# Patient Record
Sex: FEMALE | Race: OTHER | HISPANIC OR LATINO | Employment: PART TIME | URBAN - METROPOLITAN AREA
[De-identification: names, ages, dates, MRNs, and addresses within clinical notes are randomized per-mention and may not be internally consistent; named-entity substitution may affect disease eponyms.]

---

## 2023-01-30 ENCOUNTER — DOCUMENTATION ONLY (OUTPATIENT)
Dept: REHABILITATION | Facility: HOSPITAL | Age: 53
End: 2023-01-30

## 2023-01-30 NOTE — PROGRESS NOTES
Patient was called pertaining to missed evaluation today. Patient  did not answer but detailed message was left with information to call back.     Jacquelyn Smith, PT, DPT, PPCES

## 2023-02-07 ENCOUNTER — CLINICAL SUPPORT (OUTPATIENT)
Dept: REHABILITATION | Facility: HOSPITAL | Age: 53
End: 2023-02-07
Attending: STUDENT IN AN ORGANIZED HEALTH CARE EDUCATION/TRAINING PROGRAM
Payer: MEDICAID

## 2023-02-07 DIAGNOSIS — N39.3 PRIMARY STRESS URINARY INCONTINENCE: ICD-10-CM

## 2023-02-07 DIAGNOSIS — M62.89 PELVIC FLOOR DYSFUNCTION IN FEMALE: ICD-10-CM

## 2023-02-07 PROCEDURE — 97161 PT EVAL LOW COMPLEX 20 MIN: CPT | Mod: PN

## 2023-02-07 PROCEDURE — 97530 THERAPEUTIC ACTIVITIES: CPT | Mod: PN

## 2023-02-07 NOTE — PLAN OF CARE
[facility]   Physical Therapy Initial Evaluation     Date: 2023   Name: Marcella Castellanos  [facility] Number: [id number]    Therapy Diagnosis:   Encounter Diagnoses   Name Primary?    Primary stress urinary incontinence     Pelvic floor dysfunction in female      Physician: Vikas Noe MD    Physician Orders: PT Eval and Treat   Medical Diagnosis from Referral: N39.3 (ICD-10-CM) - Primary stress urinary incontinence  Evaluation Date: 2023  Authorization Period Expiration: 2023  Plan of Care Expiration: 2023  Progress Note Due: 2023  Visit # / Visits authorized:    FOTO: 1/3    Precautions: Standard     Time In: 10:35 AM  Time Out: 11:35 AM  Total Appointment Time (timed & untimed codes): 60 minutes    SUBJECTIVE     Date of onset: Patient reports increased pelvic pressure and discomfort with intercourse since her hysterectomy.     History of current condition - Marcella reports: Her hysterectomy was in . Symptoms started 6 years ago. Patient describes discomfort as she feels her vaginal canal isn't as tight as before. Patient reports occasional stress incontinence and denies urge incontinence. Patient reports decreased sensation and pain with intercourse. Patient reports a history of cleaning and heavy lifting which she concludes may have contributed to her symptoms. 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    OB/GYN History: , vaginal delivery, episiotomy, pushing phase over 2 hours, painful periods, painful vaginal penetration, pelvic pain, and hysterectomy and bladder lift   Using vaginal estrogen cream: Yes  Sexually active? Yes  Pain with vaginal exams, intercourse or tampon use? with intercourse (more discomfort) and vaginal exams     Bladder/Bowel History: urinary incontinence, constipation, straining to have bowel movements   Frequency of urination:   Daytime: with in normal  limits             Nighttime: 0  Difficulty initiating urine stream: No  Urine stream: strong and interrupted  Complete emptying: No  Bladder leakage: Yes  Activities that cause leakage: heavy coughing   Frequency of incidents: once a week   Amount leaked (urine): few drops  Urinary Urgency: Yes.  Able to delay the urge for at least 0-30 minute(s).  Pain with delaying the urge to urinate: No     Frequency of bowel movements: 2 times a day  Difficulty initiating BM: Yes  Quality/Shape of BM: Mobile Stool Chart type 3   Does Patient Feel Empty after BM? Yes  Bowel Urgency: No.  Able to delay the urge for at least 0-60 minute(s).  Fiber Supplements or Laxative Use? No   Pain with BM: No   Bleeding with BM: No   Colon leakage: No    Form of protection: none reported  Number of pads required in 24 hours: none     Pain:  Location: pelvic pain and low back   Current 0/10, worst 6/10, best 0/10   Pelvic Pain Duration Occurs during and after provocation   Location of Pelvic Pain: Deep pelvic floor muscles   Description: trapping   Aggravating Factors/Activities that cause symptoms: Vaginal exam/provocation and Prolonged sitting   Easing Factors: rest      Medical History: Marcella  has a past medical history of Abnormal Pap smear of cervix, Anemia, Asthma, Atypical hyperplasia of breast, Fibroids, Hydrosalpinx, Hypercholesterolemia, and Ovarian cyst.     Surgical History: Marcella Castellanos  has a past surgical history that includes Anterior vaginal repair; Augmentation of breast (Bilateral); Surgical removal of endometrioma; Laparoscopy-assisted vaginal hysterectomy; obtryx; Ovarian cyst removal; Tubal ligation; Breast biopsy (Left); and Liposuction of thigh.    Medications: Marcella has a current medication list which includes the following prescription(s): albuterol, estradiol, potassium chloride, and pravastatin.    Allergies: Review of patient's allergies indicates:  No Known Allergies     Prior Therapy/Previous treatment included:  no  Social History: live alone   Occupation: clean houses   Prior Level of Function: Pt was independent with all ADLs and iADLs without pain, no reports of incontinence of bowel or bladder.  Current Level of Function: Patient is modified independent with ADLs and functional mobility secondary to pelvic pressure and occasional incontinence.     Types of fluid intake: Water: bottles/day and Soda  Diet: Standard  Habitus: thin  Abuse/Neglect: Pt denies a history of physical or emotional abuse at this visit.       Pts goals: strengthen pelvis     OBJECTIVE     See EMR under MEDIA for written consent provided 2/7/2023  Chaperone: declined    ORTHO SCREEN  Posture in sitting: slouched   Posture in standing: forward and rounded shoulders   Pelvic alignment: Not assessed today      BREATHING MECHANICS ASSESSMENT   Thorax Assessment During Quiet Respiration: Decreased excursion of abdominal wall  and Decreased excursion bilaterally of lateral ribs     VAGINAL PELVIC FLOOR EXAM    EXTERNAL ASSESSMENT  Introitus: WNL  Skin condition: redness and atrophy   Scarring: did not formally assess today    Sensation: WNL   Pain: none  Voluntary contraction: nil  Voluntary relaxation: nil  Involuntary contraction: nil  Bearing down: visible lift  Perineal descent: present      INTERNAL ASSESSMENT  Pain: tender areas noted as follows: bilaterally through levator ani (layer 3)   Sensation: able to sense generalized pressure, unable to identify location   Vaginal vault: with in normal limits     Muscle Bulk: atrophy   Muscle Power: 3/5 with verbal cue to tighten around finger   Muscle Endurance: 10 sec  # Reps To Fatigue: 1    Fast Contractions in 10 seconds: 3     Quality of contraction: WNL   Specificity: WNL   Coordination: tends to hold breath during PFM contration   Prolapse check: Grade 2 cystocele; maximal verbal cues to get patient to accurately bulge   Does Pelvic Floor drop and relax with a diaphragmatic breath?  no      TREATMENT     Treatment Time In: 11:25 AM  Treatment Time Out: 11:35 AM  Total Treatment time (time-based codes) separate from Evaluation: 10 minutes    Therapeutic Activity Patient participated in dynamic functional therapeutic activities to improve functional performance for 10 minutes. Including: Education as described below.     Patient Education provided:   general anatomy/physiology of urinary/ bowel  system, benefits of treatment, risks of treatment, and alternative methods of treatment was discussed with the pt. Additionally, bladder irritants, anatomy/physiology of pelvic floor, posture/body mechanices, diaphragmatic breathing, kegels, behavior modifications, and Coordination of kegels with functional activities such as cough, laugh, sneeze, lift, etc.  was reviewed.     Home Exercises provided:  Written Home Exercises provided: yes.  Exercises were reviewed and Marcella was able to demonstrate them prior to the end of the session.    Marcella demonstrated good  understanding of the education provided.     See EMR under Patient Instructions for exercises provided 2/7/2023.    ASSESSMENT     Marcella is a 52 y.o. female referred to outpatient Physical Therapy with a medical diagnosis of N39.3 (ICD-10-CM) - Primary stress urinary incontinence. Pt presents with altered posture, poor knowledge of body mechanics and posture, pelvic floor tenderness, perineal descent, decreased pelvic muscle strength, decreased phasic ability of the pelvic muscles, poor coordination of pelvic floor muscles during ADL's leading to urinary or fecal leakage, poor fluid intake, incomplete urination, and dysfunctional defecation. Patient presents with signs and symptoms consistent with pelvic organ prolapse and pelvic floor dysfunction.       Pt prognosis is Good.   Pt will benefit from skilled outpatient Physical Therapy to address the deficits stated above and in the chart below, provide pt/family education, and to maximize pt's level of  independence.     Plan of care discussed with patient: Yes  Pt's spiritual, cultural and educational needs considered and patient is agreeable to the plan of care and goals as stated below:     Anticipated Barriers for therapy: none    Medical Necessity is demonstrated by the following:    History  Co-morbidities and personal factors that may impact the plan of care Co-morbidities   prior pelvic surgery    Personal Factors  age     low   Examination  Body structures and functions, activity limitations and participation restrictions that may impact the plan of care Body Regions/Systems/Functions:  altered posture, poor knowledge of body mechanics and posture, pelvic floor tenderness, perineal descent, decreased pelvic muscle strength, decreased phasic ability of the pelvic muscles, poor coordination of pelvic floor muscles during ADL's leading to urinary or fecal leakage, poor fluid intake, incomplete urination, and dysfunctional defecation     Activity Limitations:  intercourse/vaginal exam/tampon use without pain, incontinence with ADLs, and Participating in social activities and ADLs due to pelvic pressure    Participation Restrictions:  all ADLs/iADLs uninterrupted by urinary incontinence/urgency/frequency, Pelvic pressure with ADLs. , and exercise restrictions due to incontinence     Activity limitations:   Learning and applying knowledge  no deficits    General Tasks and Commands  no deficits    Communication  no deficits    Mobility  no deficits    Self care  no deficits    Domestic Life  shopping  cooking  doing house work (cleaning house, washing dishes, laundry)    Interactions/Relationships  intimate relationships    Life Areas  employment    Community and Social Life  community life  recreation and leisure       moderate   Clinical Presentation stable and uncomplicated low   Decision Making/ Complexity Score: low       Goals:  Short Term Goals: 4 weeks   Pt to be edu pelvic muscle bracing and be able to  consistently perform correctly and quickly to help decrease incontinence with cough/laugh/sneeze.  Pt to demonstrate proper diaphragmatic breathing to help with calming the nervous system in order to decrease pain and to improve abdominal wall musculature extensibility.   Pt to report being able to complete a half day at work without significant increase in pain to demonstrate a return towards prior level of function.  Pt to demonstrate good body mechanics when performing ADLs such as lifting and bending to decrease strain to lumbopelvic structures and reduce risk of further injury.  Pt to demonstrate proper positioning on commode with breathing techniques to decrease strain with BM to enable pt to feel empty after BM.       Long Term Goals: 12 weeks   Pt to be discharged with home plan for carry over after discharge.    Pt to report elimination of incontinence with ADLs to demonstrate improved pelvic floor muscle strength and coordination.  Pt will be trained and compliant with postural strategies in sitting and standing to improve alignment and decrease pain and muscle fatigue  Pt to report being able to complete a full day at work without significant increase in pain to demonstrate a return towards prior level of function.      PLAN   Plan of care Certification: 2/7/2023 to 4/30/2023.    Outpatient Physical Therapy 1 times weekly for 12 weeks to include the following interventions: therapeutic exercises, therapeutic activity, neuromuscular re-education, manual therapy, modalities PRN, patient/family education, and self care/home management    Jacquelyn Smith, PT, DPT, PPCES      I CERTIFY THE NEED FOR THESE SERVICES FURNISHED UNDER THIS PLAN OF TREATMENT AND WHILE UNDER MY CARE   Physician's comments:     Physician's Signature: ___________________________________________________

## 2023-02-13 ENCOUNTER — CLINICAL SUPPORT (OUTPATIENT)
Dept: REHABILITATION | Facility: HOSPITAL | Age: 53
End: 2023-02-13
Payer: MEDICAID

## 2023-02-13 DIAGNOSIS — M62.89 PELVIC FLOOR DYSFUNCTION IN FEMALE: Primary | ICD-10-CM

## 2023-02-13 PROCEDURE — 97112 NEUROMUSCULAR REEDUCATION: CPT | Mod: PN

## 2023-02-13 PROCEDURE — 97530 THERAPEUTIC ACTIVITIES: CPT | Mod: PN

## 2023-02-13 PROCEDURE — 97110 THERAPEUTIC EXERCISES: CPT | Mod: PN

## 2023-02-13 NOTE — PROGRESS NOTES
"  Physical Therapy Daily Treatment Note     Name: Marcella Castellanos  Clinic Number: [id number]    Therapy Diagnosis:   Encounter Diagnosis   Name Primary?    Pelvic floor dysfunction in female Yes     Physician: Vikas Noe MD    Visit Date: 2/13/2023    Physician Orders: PT Eval and Treat   Medical Diagnosis from Referral: N39.3 (ICD-10-CM) - Primary stress urinary incontinence  Evaluation Date: 2/7/2023  Authorization Period Expiration: 2/20/2023  Plan of Care Expiration: 4/30/2023  Progress Note Due: 2/28/2023  Visit # / Visits authorized: 1/ 1   FOTO: 1/3     Precautions: Standard     Time In: 2:02 PM   Time Out: 2:55 PM  Total Billable Time: 53 minutes    Precautions: Standard    Subjective     Pt reports: too soon to see any progress yet.   She was compliant with home exercise program.  Response to previous treatment: no change reported   Functional change: no change reported     Pain: 0/10  Location: none     Constitutional Symptoms Review: The patient denies having any constitutional symptoms.     Objective   No intravaginal treatment today.  Signed consent form already on file.     Therapeutic Exercise to develop  strength, endurance, ROM, posture, and core stabilization for 30 minutes including:   Bridges 2 x 10   Steam boats x 10 2  Sit to stands with add ball 2 x 10   HL hip add with ball 10 x 10 seconds   Anti rotations 2 x 10 7#   SL reverse clams 2 x 10   posterior pelvic tilts 10 x 10 seconds   side stepping x 10    Pelvic circles on physio ball x 20 cw/ccw    Neuromuscular Re-education to develop Coordination, Control, and Posture for 13 minutes including:   TA contraction 5" x 10   diaphragmatic breathing x 3 minutes   kegel Quick flicks 2 x 10    Endurance holds 5" x 10     Manual Therapy to develop flexibility, extensibility, and desensitization for 0 minutes including:  none     Therapeutic Activity Patient participated in dynamic functional therapeutic activities to improve functional " performance for 10 minutes. Including: Education as described below.  -kegel with stress     Home Exercises Provided and Patient Education Provided     Education provided:   bladder irritants, anatomy/physiology of pelvic floor, posture/body mechanices, bladder retraining, diaphragmatic breathing, isometric abdominal exercises, kegels, behavior modifications, and Coordination of kegels with functional activities such as cough, laugh, sneeze, lift, etc.   Discussed progression of plan of care with patient; educated pt in activity modification; reviewed HEP with pt. Pt demonstrated and verbalized understanding of all instruction and was provided with a handout of HEP (see Patient Instructions).    Written Home Exercises Provided: yes.  Exercises were reviewed and Marcella was able to demonstrate them prior to the end of the session.  Marcella demonstrated good  understanding of the education provided.     See EMR under Patient Instructions for exercises provided 2/13/2023.    Assessment     Patient presents with continued pelvic pressure. Patient was started on pelvic floor muscle strengthening and stretching for optional length tension relationship of muscles. Core and hip strengthening exercises were performed for increased postural stability to better support pelvic floor musculature. Patient tolerated all exercises well with no complaints. Patient was taught kegel with activities which make her leak/stress to decrease stress incontinence. Pt will continue to benefit from skilled outpatient physical therapy to address the deficits listed in the problem list box on initial evaluation, provide pt/family education and to maximize pt's level of independence in the home and community environment.       Marcella Is progressing well towards her goals.   Pt prognosis is Good.     Anticipated barriers to physical therapy: language barrier     Progress towards goals:  good    Goals:   Short Term Goals: 4 weeks   Pt to be edu pelvic muscle  bracing and be able to consistently perform correctly and quickly to help decrease incontinence with cough/laugh/sneeze.  Pt to demonstrate proper diaphragmatic breathing to help with calming the nervous system in order to decrease pain and to improve abdominal wall musculature extensibility.   Pt to report being able to complete a half day at work without significant increase in pain to demonstrate a return towards prior level of function.  Pt to demonstrate good body mechanics when performing ADLs such as lifting and bending to decrease strain to lumbopelvic structures and reduce risk of further injury.  Pt to demonstrate proper positioning on commode with breathing techniques to decrease strain with BM to enable pt to feel empty after BM.         Long Term Goals: 12 weeks   Pt to be discharged with home plan for carry over after discharge.    Pt to report elimination of incontinence with ADLs to demonstrate improved pelvic floor muscle strength and coordination.  Pt will be trained and compliant with postural strategies in sitting and standing to improve alignment and decrease pain and muscle fatigue  Pt to report being able to complete a full day at work without significant increase in pain to demonstrate a return towards prior level of function.    New/Revised goals: Continue with current established goals at this time.      Pt's spiritual, cultural and educational needs considered and pt agreeable to plan of care and goals.    Plan     Plan of care Certification: 2/7/2023 to 4/30/2023.     Continue with established Plan of Care, working toward established PT goals.      Jacquelyn Smith, PT, DPT, PPCES   2/13/2023

## 2023-02-21 ENCOUNTER — DOCUMENTATION ONLY (OUTPATIENT)
Dept: REHABILITATION | Facility: HOSPITAL | Age: 53
End: 2023-02-21
Payer: MEDICAID

## 2023-02-21 NOTE — PROGRESS NOTES
Patient was called pertaining to missed appointment yesterday. Patient reports she called and was told she didn't have an appointment. Patient was given the proper number to call and was scheduled for next week.    Jacquelyn Smith, PT, DPT, PPCES

## 2023-02-28 ENCOUNTER — CLINICAL SUPPORT (OUTPATIENT)
Dept: REHABILITATION | Facility: HOSPITAL | Age: 53
End: 2023-02-28
Payer: MEDICAID

## 2023-02-28 DIAGNOSIS — M62.89 PELVIC FLOOR DYSFUNCTION IN FEMALE: Primary | ICD-10-CM

## 2023-02-28 PROCEDURE — 97110 THERAPEUTIC EXERCISES: CPT | Mod: PN

## 2023-02-28 NOTE — PATIENT INSTRUCTIONS
Proper Pressure Management  Avoid Constipation - make high-fiber foods part of your regular diet (fruits, vegetables, bran, and beans), reduce the amount of processed foods in your diet, drink plenty of water and fluids every day, exercise daily, and manage your stress with meditation or other relaxation techniques  No Straining! Straining (bearing down and holding your breath) puts additional downward pressure on our organs, causing increased prolapse and pelvic pressure. Instead, blow out the candles as you gently push down. Do NOT hold your breath!  Use a Stool - Promote a squat position when voiding. Get your knees up higher than your hips. Squatting helps relax the pelvic floor muscles and reduces straining.  Avoid heavy lifting and high-impact activities until you can strengthen your core and pelvic floor muscles appropriately. When you do have to lift hold the load close to your body to reduce strain and do not hold your breath when lifting.  Do not Valsalva (hold your breath and push) at any time  Use diaphragmatic breathing (belly breathing) to manage stress, help empty bladder, help empty bowels, and help lightly stretch pelvic floor muscles     Bowel Program  Adult bowel function is considered normal when:   You have a bowel movement every day or every other day  Your bowel movement is soft in consistency - like a banana  Toileting is completed without straining  There is a presence of controllable urge.  Tips for efficient bowel training:  Drink a glass of warm water upon waking in the morning to stimulate the GI tract  Do NOT skip breakfast  Best time for a bowel movement is 30 min-1 hour after waking or after breakfast due to the gastro-colic reflex. There is a 2-3-fold increase in colonic motility after waking!  Be sure to devote enough time toward bowel function in the morning (at least 5-10 minutes), but you shouldn't need to sit longer than that.  Eat regular meals at similar times every day  Aim  for 25-30 grams of fiber for women or 35-40 grams of fiber for men each day. Start adding fiber slowly to diet and try to spread fiber out throughout the day as opposed to at one meal to avoid gas and abdominal pain. Fiber increases stool weight and accelerates colonic transit time.  Drink 6-8 (8oz) glasses of water daily and avoid caffeine drinks   Do NOT delay an urge to defecate, be prepared for need in public  Ritualize bowel habits to establish a routine pattern of bowel movements  Most people who have normal bowel patterns usually empty their bowels at the same time each day- conditioned reflex  Proper Toileting Mechanics:  Sit on toilet and lean forward with feet positioned on a step or stool so knees are higher than hips. Use a Squatty Potty!  Do NOT close glottis - keep mouth and throat open. This facilitates PF muscle relaxation! (ex: blow up a balloon, gently blow air out, blow bubbles)  Do NOT hold breath or strain!

## 2023-02-28 NOTE — PROGRESS NOTES
"  Physical Therapy Daily Treatment Note     Name: Marcella Castellanos  Clinic Number: [id number]    Therapy Diagnosis:   Encounter Diagnosis   Name Primary?    Pelvic floor dysfunction in female Yes       Physician: Vikas Noe MD    Visit Date: 2/28/2023    Physician Orders: PT Eval and Treat   Medical Diagnosis from Referral: N39.3 (ICD-10-CM) - Primary stress urinary incontinence  Evaluation Date: 2/7/2023  Authorization Period Expiration: 2/20/2023  Plan of Care Expiration: 4/30/2023  Progress Note Due: 2/28/2023  Visit # / Visits authorized: 2/ 4  FOTO: 1/3     Precautions: Standard     Time In: 4:00 PM   Time Out: 4:58 PM  Total Billable Time: 58 minutes    Precautions: Standard    Subjective     Pt reports: feeling better, cant feel the pelvic pressure as much any more.   She was compliant with home exercise program.  Response to previous treatment: no change reported   Functional change: no change reported     Pain: 0/10  Location: none     Constitutional Symptoms Review: The patient denies having any constitutional symptoms.     Objective   No intravaginal treatment today.  Signed consent form already on file.     Therapeutic Exercise to develop  strength, endurance, ROM, posture, and core stabilization for 28 minutes including:   Bridges 2 x 10   Steam boats x 10   Sit to stands with add ball 2 x 10   HL hip add with ball 10 x 10 seconds   Anti rotations 2 x 10 7#  SL reverse clams 2 x 10   posterior pelvic tilts 10 x 10 seconds   Pelvic circles on physio ball x 20 cw/ccw     Deferred: side stepping x 10      Neuromuscular Re-education to develop Coordination, Control, and Posture for 15 minutes including:   TA contraction 5" x 10   diaphragmatic breathing x 3 minutes   kegel Quick flicks 2 x 10    With march 2 x 10    Endurance holds 5" x 10     Manual Therapy to develop flexibility, extensibility, and desensitization for 0 minutes including:  none     Therapeutic Activity Patient participated in " dynamic functional therapeutic activities to improve functional performance for 15 minutes. Including: Education as described below.  - pressure management   - constipation and bowel health   - reinforced kegel with stress  - foods high in fiber     Home Exercises Provided and Patient Education Provided     Education provided:   bladder irritants, anatomy/physiology of pelvic floor, posture/body mechanices, bladder retraining, diaphragmatic breathing, isometric abdominal exercises, kegels, behavior modifications, and Coordination of kegels with functional activities such as cough, laugh, sneeze, lift, etc.   Discussed progression of plan of care with patient; educated pt in activity modification; reviewed HEP with pt. Pt demonstrated and verbalized understanding of all instruction and was provided with a handout of HEP (see Patient Instructions).    Written Home Exercises Provided: yes.  Exercises were reviewed and Marcella was able to demonstrate them prior to the end of the session.  Marcella demonstrated good  understanding of the education provided.     See EMR under Patient Instructions for exercises provided 2/13/2023.    Assessment     Patient presents with decreased pelvic pressure. Patient was started on pelvic floor muscle strengthening was continued for maximal pelvic floor muscular function. Core and hip strengthening exercises were continued for increased postural stability to better support pelvic floor musculature. Patient tolerated all exercises well with no complaints. Patient was taught bowel health for decreased constipation and pressure management for decreased risk of prolapse. Pt will continue to benefit from skilled outpatient physical therapy to address the deficits listed in the problem list box on initial evaluation, provide pt/family education and to maximize pt's level of independence in the home and community environment.       Marcella Is progressing well towards her goals.   Pt prognosis is Good.      Anticipated barriers to physical therapy: language barrier     Progress towards goals:  good    Goals:   Short Term Goals: 4 weeks   Pt to be edu pelvic muscle bracing and be able to consistently perform correctly and quickly to help decrease incontinence with cough/laugh/sneeze.   Pt to demonstrate proper diaphragmatic breathing to help with calming the nervous system in order to decrease pain and to improve abdominal wall musculature extensibility.   Pt to report being able to complete a half day at work without significant increase in pain to demonstrate a return towards prior level of function.  Pt to demonstrate good body mechanics when performing ADLs such as lifting and bending to decrease strain to lumbopelvic structures and reduce risk of further injury.  Pt to demonstrate proper positioning on commode with breathing techniques to decrease strain with BM to enable pt to feel empty after BM.         Long Term Goals: 12 weeks   Pt to be discharged with home plan for carry over after discharge.    Pt to report elimination of incontinence with ADLs to demonstrate improved pelvic floor muscle strength and coordination.  Pt will be trained and compliant with postural strategies in sitting and standing to improve alignment and decrease pain and muscle fatigue  Pt to report being able to complete a full day at work without significant increase in pain to demonstrate a return towards prior level of function.    New/Revised goals: Continue with current established goals at this time.      Pt's spiritual, cultural and educational needs considered and pt agreeable to plan of care and goals.    Plan     Plan of care Certification: 2/7/2023 to 4/30/2023.     Continue with established Plan of Care, working toward established PT goals.      Jacquelyn Smith, PT, DPT, PPCES   2/28/2023

## 2023-03-06 ENCOUNTER — CLINICAL SUPPORT (OUTPATIENT)
Dept: REHABILITATION | Facility: HOSPITAL | Age: 53
End: 2023-03-06
Payer: MEDICAID

## 2023-03-06 DIAGNOSIS — M62.89 PELVIC FLOOR DYSFUNCTION IN FEMALE: Primary | ICD-10-CM

## 2023-03-06 PROCEDURE — 97110 THERAPEUTIC EXERCISES: CPT | Mod: PN

## 2023-03-06 NOTE — PROGRESS NOTES
"  Physical Therapy Daily Treatment Note     Name: Marcella Castellanos  Clinic Number: [id number]    Therapy Diagnosis:   Encounter Diagnosis   Name Primary?    Pelvic floor dysfunction in female Yes     Physician: Vikas Noe MD    Visit Date: 3/6/2023    Physician Orders: PT Eval and Treat   Medical Diagnosis from Referral: N39.3 (ICD-10-CM) - Primary stress urinary incontinence  Evaluation Date: 2/7/2023  Authorization Period Expiration: 2/20/2023  Plan of Care Expiration: 4/30/2023  Progress Note Due: 4/6/2023  Visit # / Visits authorized: 3/ 4  FOTO: 1/3     Precautions: Standard     Time In: 2:00 PM   Time Out: 2:55 PM  Total Billable Time: 55 minutes    Precautions: Standard    Subjective     Pt reports: she doesn't feel the pelvic pressure anymore and had no leaking this week.  She was compliant with home exercise program.  Response to previous treatment: no change reported   Functional change: no change reported     Pain: 0/10  Location: none     Constitutional Symptoms Review: The patient denies having any constitutional symptoms.     Objective   No intravaginal treatment today.  Signed consent form already on file.     Therapeutic Exercise to develop  strength, endurance, ROM, posture, and core stabilization for 30 minutes including:   Bridges 2 x 10   Steam boats x 10   Sit to stands with add ball 2 x 10   HL hip add with ball 10 x 10 seconds   Anti rotations 2 x 10 7#   SL reverse clams 2 x 10   Pelvic circles on physio ball x 20 cw/ccw   side stepping x 5 laps in // bars   Tandem walking for hip stability x 5 laps in // bars     Deferred:   posterior pelvic tilts 10 x 10 seconds     Neuromuscular Re-education to develop Coordination, Control, and Posture for 20 minutes including:   TA contraction 5" x 10    + With march x 20   + With ball toss x 30   diaphragmatic breathing x 1 minutes   kegel Quick flicks 2 x 10    With march 2 x 10    Endurance holds 5" x 10     Manual Therapy to develop " flexibility, extensibility, and desensitization for 0 minutes including:  none     Therapeutic Activity Patient participated in dynamic functional therapeutic activities to improve functional performance for 5 minutes. Including: Education as described below.  - plan of care/ discharge next visit     Home Exercises Provided and Patient Education Provided     Education provided:   bladder irritants, anatomy/physiology of pelvic floor, posture/body mechanices, bladder retraining, diaphragmatic breathing, isometric abdominal exercises, kegels, behavior modifications, and Coordination of kegels with functional activities such as cough, laugh, sneeze, lift, etc.   Discussed progression of plan of care with patient; educated pt in activity modification; reviewed HEP with pt. Pt demonstrated and verbalized understanding of all instruction and was provided with a handout of HEP (see Patient Instructions).    Written Home Exercises Provided: yes.  Exercises were reviewed and Marcella was able to demonstrate them prior to the end of the session.  Marcella demonstrated good  understanding of the education provided.     See EMR under Patient Instructions for exercises provided 2/13/2023.    Assessment     Patient presents with decreased pelvic pressure and no urinary incontinence. Patient is progressing well with therapy and has met 8 out of 9 goals. Exercise program was progressed and patient tolerated well with minimal fatigue. Plan to discharge next visit.     Marcella Is progressing well towards her goals.   Pt prognosis is Good.     Anticipated barriers to physical therapy: language barrier     Progress towards goals:  good    Goals:   Short Term Goals: 4 weeks   Pt to be edu pelvic muscle bracing and be able to consistently perform correctly and quickly to help decrease incontinence with cough/laugh/sneeze. MET 3/6/2023  Pt to demonstrate proper diaphragmatic breathing to help with calming the nervous system in order to decrease pain  and to improve abdominal wall musculature extensibility. MET 3/6/2023  Pt to report being able to complete a half day at work without significant increase in pain to demonstrate a return towards prior level of function. MET 3/6/2023  Pt to demonstrate good body mechanics when performing ADLs such as lifting and bending to decrease strain to lumbopelvic structures and reduce risk of further injury. MET 3/6/2023  Pt to demonstrate proper positioning on commode with breathing techniques to decrease strain with BM to enable pt to feel empty after BM. MET 3/6/2023        Long Term Goals: 12 weeks   Pt to be discharged with home plan for carry over after discharge.    Pt to report elimination of incontinence with ADLs to demonstrate improved pelvic floor muscle strength and coordination. MET 3/6/2023  Pt will be trained and compliant with postural strategies in sitting and standing to improve alignment and decrease pain and muscle fatigue. MET 3/6/2023  Pt to report being able to complete a full day at work without significant increase in pain to demonstrate a return towards prior level of function. MET 3/6/2023    New/Revised goals: Continue with current established goals at this time.      Pt's spiritual, cultural and educational needs considered and pt agreeable to plan of care and goals.    Plan     Plan of care Certification: 2/7/2023 to 4/30/2023.     Continue with established Plan of Care, working toward established PT goals.      Jacquelyn Smith, PT, DPT, PPCES   3/6/2023

## 2023-03-13 ENCOUNTER — CLINICAL SUPPORT (OUTPATIENT)
Dept: REHABILITATION | Facility: HOSPITAL | Age: 53
End: 2023-03-13
Payer: MEDICAID

## 2023-03-13 DIAGNOSIS — M62.89 PELVIC FLOOR DYSFUNCTION IN FEMALE: Primary | ICD-10-CM

## 2023-03-13 PROCEDURE — 97110 THERAPEUTIC EXERCISES: CPT | Mod: PN

## 2023-03-13 NOTE — PLAN OF CARE
[facility]   Discharge Note    Name: Marcella Castellanos  Clinic Number: [id number]    Therapy Diagnosis:   Encounter Diagnosis   Name Primary?    Pelvic floor dysfunction in female Yes     Physician: Vikas Noe MD    Physician Orders: PT Eval and Treat   Medical Diagnosis from Referral: N39.3 (ICD-10-CM) - Primary stress urinary incontinence  Evaluation Date: 2/7/2023    Date of Last visit: 3/13/2023  Total Visits Received: 4    ASSESSMENT        Discharge reason: Patient has met all of his/her goals    Discharge FOTO Score: 0% limitation     Goals: Short Term Goals: 4 weeks   Pt to be edu pelvic muscle bracing and be able to consistently perform correctly and quickly to help decrease incontinence with cough/laugh/sneeze. MET 3/6/2023  Pt to demonstrate proper diaphragmatic breathing to help with calming the nervous system in order to decrease pain and to improve abdominal wall musculature extensibility. MET 3/6/2023  Pt to report being able to complete a half day at work without significant increase in pain to demonstrate a return towards prior level of function. MET 3/6/2023  Pt to demonstrate good body mechanics when performing ADLs such as lifting and bending to decrease strain to lumbopelvic structures and reduce risk of further injury. MET 3/6/2023  Pt to demonstrate proper positioning on commode with breathing techniques to decrease strain with BM to enable pt to feel empty after BM. MET 3/6/2023        Long Term Goals: 12 weeks   Pt to be discharged with home plan for carry over after discharge.  MET 3/13/2023  Pt to report elimination of incontinence with ADLs to demonstrate improved pelvic floor muscle strength and coordination. MET 3/6/2023  Pt will be trained and compliant with postural strategies in sitting and standing to improve alignment and decrease pain and muscle fatigue. MET 3/6/2023  Pt to report being able to complete a full day at work without significant  increase in pain to demonstrate a return towards prior level of function. MET 3/6/2023    PLAN   This patient is discharged from Physical Therapy      Jacquelyn Smith PT

## 2023-03-13 NOTE — PROGRESS NOTES
"  Physical Therapy Daily Treatment Note     Name: Marcella Castellanos  Clinic Number: [id number]    Therapy Diagnosis:   Encounter Diagnosis   Name Primary?    Pelvic floor dysfunction in female Yes       Physician: Vikas Noe MD    Visit Date: 3/13/2023    Physician Orders: PT Eval and Treat   Medical Diagnosis from Referral: N39.3 (ICD-10-CM) - Primary stress urinary incontinence  Evaluation Date: 2/7/2023  Authorization Period Expiration: 2/20/2023  Plan of Care Expiration: 4/30/2023  Progress Note Due: 4/6/2023  Visit # / Visits authorized: 4/ 4  FOTO: 1/3     Precautions: Standard     Time In: 2:02 PM   Time Out: 3:57 PM  Total Billable Time: 55 minutes    Precautions: Standard    Subjective     Pt reports: learning to ride a bike with a friend and laughing a lot causing minor leaking. Patient still agreeable to discharge today.    She was compliant with home exercise program.  Response to previous treatment: no change reported   Functional change: no change reported     Pain: 0/10  Location: none     Constitutional Symptoms Review: The patient denies having any constitutional symptoms.     Objective       No intravaginal treatment today.  Signed consent form already on file.     Therapeutic Exercise to develop  strength, endurance, ROM, posture, and core stabilization for 30 minutes including:   Bridges 2 x 10   Steam boats x 10   Sit to stands with add ball 2 x 10   HL hip add with ball 10 x 10 seconds   Anti rotations 2 x 10 7#   SL reverse clams 2 x 10   Pelvic circles on physio ball x 20 cw/ccw   side stepping x 5 laps in // bars   Tandem walking for hip stability x 5 laps in // bars   Free motion pull down with march for core stability x 20 (7# each)   Step up high knee x 20     Deferred:   posterior pelvic tilts 10 x 10 seconds     Neuromuscular Re-education to develop Coordination, Control, and Posture for 20 minutes including:   TA contraction 5" x 10    With march x 20    With ball toss x 30 " "  diaphragmatic breathing x 2 minutes   kegel Quick flicks 2 x 10    With march 2 x 10    Endurance holds 5" x 10     Manual Therapy to develop flexibility, extensibility, and desensitization for 0 minutes including:  none     Therapeutic Activity Patient participated in dynamic functional therapeutic activities to improve functional performance for 5 minutes. Including: Education as described below.  - plan of care/ discharge next visit     Home Exercises Provided and Patient Education Provided     Education provided:   bladder irritants, anatomy/physiology of pelvic floor, posture/body mechanices, bladder retraining, diaphragmatic breathing, isometric abdominal exercises, kegels, behavior modifications, and Coordination of kegels with functional activities such as cough, laugh, sneeze, lift, etc.   Discussed progression of plan of care with patient; educated pt in activity modification; reviewed HEP with pt. Pt demonstrated and verbalized understanding of all instruction and was provided with a handout of HEP (see Patient Instructions).    Written Home Exercises Provided: yes.  Exercises were reviewed and Marcella was able to demonstrate them prior to the end of the session.  Marcella demonstrated good  understanding of the education provided.     See EMR under Patient Instructions for exercises provided 2/13/2023.    Assessment     Patient presents with decreased pelvic pressure and urinary incontinence. Patient has met all of her goals and has no farther need for therapy at this time.     Marcella Is progressing well towards her goals.   Pt prognosis is Good.     Anticipated barriers to physical therapy: language barrier     Progress towards goals:  good    Goals:   Short Term Goals: 4 weeks   Pt to be edu pelvic muscle bracing and be able to consistently perform correctly and quickly to help decrease incontinence with cough/laugh/sneeze. MET 3/6/2023  Pt to demonstrate proper diaphragmatic breathing to help with calming the " nervous system in order to decrease pain and to improve abdominal wall musculature extensibility. MET 3/6/2023  Pt to report being able to complete a half day at work without significant increase in pain to demonstrate a return towards prior level of function. MET 3/6/2023  Pt to demonstrate good body mechanics when performing ADLs such as lifting and bending to decrease strain to lumbopelvic structures and reduce risk of further injury. MET 3/6/2023  Pt to demonstrate proper positioning on commode with breathing techniques to decrease strain with BM to enable pt to feel empty after BM. MET 3/6/2023        Long Term Goals: 12 weeks   Pt to be discharged with home plan for carry over after discharge.  MET 3/13/2023  Pt to report elimination of incontinence with ADLs to demonstrate improved pelvic floor muscle strength and coordination. MET 3/6/2023  Pt will be trained and compliant with postural strategies in sitting and standing to improve alignment and decrease pain and muscle fatigue. MET 3/6/2023  Pt to report being able to complete a full day at work without significant increase in pain to demonstrate a return towards prior level of function. MET 3/6/2023    New/Revised goals: Continue with current established goals at this time.      Pt's spiritual, cultural and educational needs considered and pt agreeable to plan of care and goals.    Plan     Plan of care Certification: 2/7/2023 to 4/30/2023.     Discharge from pelvic floor physcial therapy     Jacquelyn Smith, PT, DPT, PPCES   3/13/2023

## 2023-07-15 PROBLEM — N60.92 ATYPICAL DUCTAL HYPERPLASIA OF LEFT BREAST: Status: ACTIVE | Noted: 2023-07-15

## 2023-07-15 PROBLEM — N64.4 MASTODYNIA: Status: ACTIVE | Noted: 2023-07-15
